# Patient Record
Sex: FEMALE | Race: WHITE | NOT HISPANIC OR LATINO | Employment: STUDENT | URBAN - METROPOLITAN AREA
[De-identification: names, ages, dates, MRNs, and addresses within clinical notes are randomized per-mention and may not be internally consistent; named-entity substitution may affect disease eponyms.]

---

## 2023-09-20 ENCOUNTER — TELEPHONE (OUTPATIENT)
Dept: PSYCHIATRY | Facility: CLINIC | Age: 18
End: 2023-09-20

## 2023-09-20 NOTE — TELEPHONE ENCOUNTER
Patient has been added to the Medication Management wait list without a referral.    Insurance: [employer]  Insurance Type:    Commercial [x]   Medicaid []   [place] (if applicable)   Medicare []  Location Preference: [place]  Provider Preference: None  Virtual: Yes [x] No []  Were outside resources sent: Yes [] No [x]    Advised the patient to contact her PCP for a referral.

## 2024-08-06 ENCOUNTER — TELEPHONE (OUTPATIENT)
Age: 19
End: 2024-08-06

## 2024-08-16 NOTE — TELEPHONE ENCOUNTER
Patient returned call from previous encounter writer informed pt IC was reaching out to pt regarding the medication mgmt wait list pt was on pt stated she no longer needed services. Writer informed pt she was removed from wait list.

## 2024-08-16 NOTE — TELEPHONE ENCOUNTER
Contacted patient off of Medication Management  to verify needs of services in attempts to offer patient an appointment. Spoke with pt mom whom stated pt was not around but would have them call back. Final attempt.     Pt mom was not given any info due to no consent.